# Patient Record
Sex: FEMALE | Race: WHITE | NOT HISPANIC OR LATINO | Employment: FULL TIME | URBAN - METROPOLITAN AREA
[De-identification: names, ages, dates, MRNs, and addresses within clinical notes are randomized per-mention and may not be internally consistent; named-entity substitution may affect disease eponyms.]

---

## 2023-02-27 ENCOUNTER — SPECIALTY PHARMACY (OUTPATIENT)
Dept: PHARMACY | Facility: CLINIC | Age: 23
End: 2023-02-27

## 2023-02-27 NOTE — TELEPHONE ENCOUNTER
Incoming call from [facility] at 008 to inform [facility] order for Tremfya was received and profiled.  Provider is an outside derm provider.  Prescription transferred from 008.  PA required.  Notifying GI/Derm team to work on PA.

## 2023-02-28 NOTE — TELEPHONE ENCOUNTER
Order set opened for Tremfya, outgoing call to eliseo GONZALEZ referral to [phone], nurse's phone line is [phone].    PA is needed, will follow up.      Lakesha, this is Yamile Johnson with [facility].  We are working on your prescription that your doctor has sent us. We will be working with your insurance to get this approved for you. We will be calling you along the way with updates on your medication.  If you have any questions, you can reach us at [phone].    Welcome call outcome: Patient/caregiver reached    Patient reports being on Tremfya therapy for 2 years, has one dose on hand for 3/15/23 injection . Normally dispensed through [employer], will follow up with referral forms to submit authorization. Patient expressed understanding.

## 2023-03-02 NOTE — TELEPHONE ENCOUNTER
Outgoing call to [facility] FARZANEH with [facility] requesting chart notes and referral form, faxed referral again to [phone].

## 2023-03-02 NOTE — TELEPHONE ENCOUNTER
Referral received from [facility] , scanned to .    DAKOTA KEY [id number],  approval on file until 12/30/23, CASE ID [id number].

## 2023-03-02 NOTE — TELEPHONE ENCOUNTER
Benefits investigation   Per [facility] website    Payor: [employer]  Annual Deductible Amount: 250  Annual Out of Packet (OOP) Maximum: 2500  Estimated Copay: 315  Network Status: in network     Outgoing call to Tremfya, loaded to myEDmatch test claim pays for 5.00. sending to initial.

## 2023-03-06 NOTE — TELEPHONE ENCOUNTER
Outgoing call to patient, patient would like initial closer to next fill date. Pending initial for 4/26/23.

## 2023-05-01 ENCOUNTER — SPECIALTY PHARMACY (OUTPATIENT)
Dept: PHARMACY | Facility: CLINIC | Age: 23
End: 2023-05-01

## 2023-05-01 DIAGNOSIS — L40.9 PSORIASIS: Primary | ICD-10-CM

## 2023-05-01 NOTE — TELEPHONE ENCOUNTER
Specialty Pharmacy - Initial Clinical Assessment    Specialty Medication Orders Linked to Encounter      Flowsheet Row Most Recent Value   Medication #1 guselkumab (TREMFYA) 100 mg/mL Syrg (Order#[id number], Rx#[id number])   Medication #2 guselkumab 100 mg/mL AtIn (Order#[id number], Rx#[id number])          Patient Diagnosis   L40.9 - Psoriasis    Subjective    Lisa Nagy is a 22 y.o. female, who is followed by the specialty pharmacy service for management and education.    Recent Encounters       Date Type Provider Description    05/01/2023 Specialty Pharmacy Yamile Johnson, Shaji Initial Clinical Assessment    02/27/2023 Specialty Pharmacy Africa Ocasio, Shaji Referral Authorization            Current Outpatient Medications   Medication Sig    guselkumab (TREMFYA) 100 mg/mL Syrg Inject 100mg into the skin every 8 weeks    guselkumab 100 mg/mL AtIn inject 100 mg under skin every 8 weeks   Last reviewed on 5/1/2023  1:13 PM by Yamile Johnson PharmD    Review of patient's allergies indicates:  Not on FileLast reviewed on    by           Assessment Questions - Documented Responses      Flowsheet Row Most Recent Value   Assessment    Medication Reconciliation completed for patient Yes   During the past 4 weeks, has patient missed any activities due to condition or medication? No   During the past 4 weeks, did patient have any of the following urgent care visits? None   Goals of Therapy Status Discussed (new start)   Status of the patients ability to self-administer: Is Able   All education points have been covered with patient? Yes, supplemental printed education provided   Welcome packet contents reviewed and discussed with patient? Yes   Assesment completed? Yes   Plan Therapy continued   Do you need to open a clinical intervention (i-vent)? No   Do you want to schedule first shipment? Yes   Medication #1 Assessment Info    Patient status Existing medication, New to [facility]   Is this medication appropriate for the  patient? Yes   Is this medication effective? Yes          Refill Questions - Documented Responses      Flowsheet Row Most Recent Value   Refill Screening Questions    When does the patient need to receive the medication? 05/14/23   Refill Delivery Questions    How will the patient receive the medication? MEDRx   When does the patient need to receive the medication? 05/14/23   Shipping Address Home   Address in [facility] confirmed and updated if neccessary? Yes   Expected Copay ($) 0   Is the patient able to afford the medication copay? Yes   Payment Method zero copay   Days supply of Refill 56   Supplies needed? No supplies needed   Refill activity completed? Yes   Refill activity plan Refill scheduled   Shipment/Pickup Date: 05/05/23            Objective    She has no past medical history on file.    Tried/failed medications: n/a    BP Readings from Last 4 Encounters:   No data found for BP     Ht Readings from Last 4 Encounters:   No data found for Ht     Wt Readings from Last 4 Encounters:   No data found for Wt     No results for input(s): CREATININE, ALT, AST, HEPBSAG, HEPBSAB, HEPBCAB in the last 2160 hours.  The goals of Psoriasis treatment include:  Reducing the size, thickness and extent of lesions and erythema  Relieving the symptoms of psoriasis  Improving and maintaining physical function  Preventing infection and other complications of treatment  Reducing long term complications of psoriasis  Minimizing psychological impact on overall well-being  Improving or maintaining quality of life  Maintaining optimal therapy adherence  Minimizing and managing side effects    Goals of Therapy Status: Discussed (new start)    Assessment/Plan  Patient plans to continue therapy without changes      Indication, dosage, appropriateness, effectiveness, safety and convenience of her specialty medication(s) were reviewed today.     Patient Education   Patient received education on the following:   Expectations and  possible outcomes of therapy  Proper use, timely administration, and missed dose management  Duration of therapy  Side effects, including prevention, minimization, and management  Contraindications and safety precautions  New or changed medications, including prescribe and over the counter medications and supplements  Reviews recommended vaccinations, as appropriate  Storage, safe handling, and disposal    Patient has been on Tremfya therapy for 2 years and reports she is doing well. Patient declined review of injection training.  Reports a flare due to stress with finals.    Tasks added this encounter   5/1/2023 - Initial Clinical Assessment/Patient Education (Annual Reassessment)  5/1/2023 - Set up Initial Fill   Tasks due within next 3 months   5/3/2023 - Initial Clinical Assessment/Patient Education (Annual Reassessment)     Yamile Johnson, PharmD  [facility]  [address]  [facility] [address]  Phone: [phone]  Fax: [phone]

## 2023-06-23 ENCOUNTER — SPECIALTY PHARMACY (OUTPATIENT)
Dept: PHARMACY | Facility: CLINIC | Age: 23
End: 2023-06-23

## 2023-06-23 DIAGNOSIS — L40.9 PSORIASIS: Primary | ICD-10-CM

## 2023-08-04 NOTE — TELEPHONE ENCOUNTER
Specialty Pharmacy - Refill Coordination    Specialty Medication Orders Linked to Encounter      Flowsheet Row Most Recent Value   Medication #1 guselkumab 100 mg/mL AtIn (Order#[id number], Rx#[id number])            Refill Questions - Documented Responses      Flowsheet Row Most Recent Value   Refill Screening Questions    Changes to allergies? No   Changes to medications? No   New conditions since last clinic visit? No   Unplanned office visit, urgent care, ED, or hospital admission in the last 4 weeks? No   How does patient/caregiver feel medication is working? Good   Financial problems or insurance changes? No   How many doses of your specialty medications were missed in the last 4 weeks? 0   Would patient like to speak to a pharmacist? No   When does the patient need to receive the medication? 08/18/23   Refill Delivery Questions    How will the patient receive the medication? MEDRx   When does the patient need to receive the medication? 08/18/23   Shipping Address Home   Address in [facility] confirmed and updated if neccessary? Yes   Expected Copay ($) 0   Is the patient able to afford the medication copay? Yes   Payment Method zero copay   Days supply of Refill 56   Supplies needed? No supplies needed   Refill activity completed? Yes   Refill activity plan Refill scheduled   Shipment/Pickup Date: 08/08/23            Current Outpatient Medications   Medication Sig    guselkumab 100 mg/mL AtIn inject 100 mg under skin every 8 weeks   Last reviewed on 5/1/2023  1:13 PM by Yamile Johnson, Shaji    Review of patient's allergies indicates:  Not on File Last reviewed on    by       Tasks added this encounter   No tasks added.   Tasks due within next 3 months   8/4/2023 - Refill Coordination Outreach (1 time occurrence)     Yamile Johnson, Shaji  [facility]  [address] [place] [address]  Phone: [phone]  Fax: [phone]

## 2024-04-11 PROBLEM — L40.9 PSORIASIS: Status: ACTIVE | Noted: 2024-04-11

## 2024-05-31 ENCOUNTER — PATIENT MESSAGE (OUTPATIENT)
Dept: ADMINISTRATIVE | Facility: OTHER | Age: 24
End: 2024-05-31

## 2025-01-08 ENCOUNTER — OFFICE VISIT (OUTPATIENT)
Dept: FAMILY MEDICINE | Facility: CLINIC | Age: 25
End: 2025-01-08
Payer: COMMERCIAL

## 2025-01-08 VITALS
OXYGEN SATURATION: 100 % | HEART RATE: 80 BPM | WEIGHT: 211.31 LBS | TEMPERATURE: 98 F | SYSTOLIC BLOOD PRESSURE: 123 MMHG | RESPIRATION RATE: 18 BRPM | HEIGHT: 64 IN | DIASTOLIC BLOOD PRESSURE: 79 MMHG | BODY MASS INDEX: 36.08 KG/M2

## 2025-01-08 DIAGNOSIS — D21.9 GRANULAR CELL TUMOR: ICD-10-CM

## 2025-01-08 DIAGNOSIS — E66.812 CLASS 2 OBESITY DUE TO EXCESS CALORIES WITHOUT SERIOUS COMORBIDITY WITH BODY MASS INDEX (BMI) OF 36.0 TO 36.9 IN ADULT: ICD-10-CM

## 2025-01-08 DIAGNOSIS — E66.09 CLASS 2 OBESITY DUE TO EXCESS CALORIES WITHOUT SERIOUS COMORBIDITY WITH BODY MASS INDEX (BMI) OF 36.0 TO 36.9 IN ADULT: ICD-10-CM

## 2025-01-08 DIAGNOSIS — L40.9 PSORIASIS: Primary | Chronic | ICD-10-CM

## 2025-01-08 DIAGNOSIS — Z11.4 SCREENING FOR HIV (HUMAN IMMUNODEFICIENCY VIRUS): ICD-10-CM

## 2025-01-08 DIAGNOSIS — N30.10 CHRONIC INTERSTITIAL CYSTITIS: Chronic | ICD-10-CM

## 2025-01-08 DIAGNOSIS — F41.9 ANXIETY: ICD-10-CM

## 2025-01-08 DIAGNOSIS — Z76.89 ENCOUNTER TO ESTABLISH CARE: ICD-10-CM

## 2025-01-08 PROBLEM — D84.821 IMMUNOSUPPRESSION DUE TO DRUG THERAPY: Chronic | Status: ACTIVE | Noted: 2021-01-19

## 2025-01-08 PROBLEM — N63.10 MASS OF RIGHT BREAST: Status: ACTIVE | Noted: 2018-05-29

## 2025-01-08 PROBLEM — Z28.21 REFUSAL OF HUMAN PAPILLOMA VIRUS (HPV) VACCINATION: Status: ACTIVE | Noted: 2020-07-09

## 2025-01-08 PROBLEM — Z79.899 IMMUNOSUPPRESSION DUE TO DRUG THERAPY: Chronic | Status: ACTIVE | Noted: 2021-01-19

## 2025-01-08 PROCEDURE — 1160F RVW MEDS BY RX/DR IN RCRD: CPT | Mod: CPTII,S$GLB,, | Performed by: STUDENT IN AN ORGANIZED HEALTH CARE EDUCATION/TRAINING PROGRAM

## 2025-01-08 PROCEDURE — 1159F MED LIST DOCD IN RCRD: CPT | Mod: CPTII,S$GLB,, | Performed by: STUDENT IN AN ORGANIZED HEALTH CARE EDUCATION/TRAINING PROGRAM

## 2025-01-08 PROCEDURE — 81025 URINE PREGNANCY TEST: CPT | Mod: PO | Performed by: STUDENT IN AN ORGANIZED HEALTH CARE EDUCATION/TRAINING PROGRAM

## 2025-01-08 PROCEDURE — 3044F HG A1C LEVEL LT 7.0%: CPT | Mod: CPTII,S$GLB,, | Performed by: STUDENT IN AN ORGANIZED HEALTH CARE EDUCATION/TRAINING PROGRAM

## 2025-01-08 PROCEDURE — 99999 PR PBB SHADOW E&M-EST. PATIENT-LVL V: CPT | Mod: PBBFAC,,, | Performed by: STUDENT IN AN ORGANIZED HEALTH CARE EDUCATION/TRAINING PROGRAM

## 2025-01-08 PROCEDURE — 3008F BODY MASS INDEX DOCD: CPT | Mod: CPTII,S$GLB,, | Performed by: STUDENT IN AN ORGANIZED HEALTH CARE EDUCATION/TRAINING PROGRAM

## 2025-01-08 PROCEDURE — 3078F DIAST BP <80 MM HG: CPT | Mod: CPTII,S$GLB,, | Performed by: STUDENT IN AN ORGANIZED HEALTH CARE EDUCATION/TRAINING PROGRAM

## 2025-01-08 PROCEDURE — G2211 COMPLEX E/M VISIT ADD ON: HCPCS | Mod: S$GLB,,, | Performed by: STUDENT IN AN ORGANIZED HEALTH CARE EDUCATION/TRAINING PROGRAM

## 2025-01-08 PROCEDURE — 3074F SYST BP LT 130 MM HG: CPT | Mod: CPTII,S$GLB,, | Performed by: STUDENT IN AN ORGANIZED HEALTH CARE EDUCATION/TRAINING PROGRAM

## 2025-01-08 PROCEDURE — 99204 OFFICE O/P NEW MOD 45 MIN: CPT | Mod: S$GLB,,, | Performed by: STUDENT IN AN ORGANIZED HEALTH CARE EDUCATION/TRAINING PROGRAM

## 2025-01-08 RX ORDER — SERTRALINE HYDROCHLORIDE 50 MG/1
50 TABLET, FILM COATED ORAL DAILY
Qty: 90 TABLET | Refills: 3 | Status: SHIPPED | OUTPATIENT
Start: 2025-01-08

## 2025-01-08 NOTE — PROGRESS NOTES
Problem List Items Addressed This Visit          Derm    Psoriasis - Primary (Chronic)    Overview     Chronic hx; stable on tremfya   Referral to derm         Relevant Orders    Ambulatory referral/consult to Dermatology       Renal/    Chronic interstitial cystitis (Chronic)    Overview     Chronic hx; stable   No concern  Previously followed with urology            Oncology    Granular cell tumor    Overview     R breast s/p resection             Endocrine    Class 2 obesity due to excess calories without serious comorbidity with body mass index (BMI) of 36.0 to 36.9 in adult    Overview     Wt Readings from Last 3 Encounters:   01/08/25 0801 95.8 kg (211 lb 4.8 oz)   09/13/24 0850 96.6 kg (213 lb)   09/08/23 0839 91.2 kg (201 lb)           General weight loss/lifestyle modification strategies discussed: limit sugary drinks, exercise 3-5x per week  Informal exercise measures discussed, e.g. taking stairs instead of elevator.                  Other Visit Diagnoses       Encounter to establish care        Relevant Orders    Lipid Panel    TSH    CBC Without Differential    Comprehensive Metabolic Panel    Hemoglobin A1C    Anxiety        Relevant Medications    sertraline (ZOLOFT) 50 MG tablet    Other Relevant Orders    Pregnancy, urine rapid (Completed)    Screening for HIV (human immunodeficiency virus)        Relevant Orders    HIV 1/2 Ag/Ab (4th Gen) (Completed)             Assessment & Plan    PSORIASIS:  - Evaluated the patient's history of psoriasis since age 7, no active lesions at this time   - Continued Tremfya for psoriasis management.  - Referred the patient to Dr. Ashly Moore for dermatology follow-up.  - Acknowledged the patient's history of psoriasis and its potential impact on other health decisions.    ANXIETY:  - Assessed the patient's anxiety, noting rare anxiety attacks usually related to school stress.  - Evaluated the patient's difficulty in managing stress and taking breaks.  - Discussed  the potential connection between anxiety and recent symptoms.  - Prescribed Zoloft for anxiety management.  - Noted the patient's current therapy twice a month for anxiety management.  - Scheduled a follow-up virtual visit in 6 weeks to assess Zoloft efficacy and overall progress.    CHRONIC HEADACHES:  - Evaluated the patient's history of chronic headaches, occurring about 3 times per week.  - Assessed the tension-type headaches starting from the neck and moving upwards.  - Discussed the relationship between stress and headaches.  - Explained the risk of rebound headaches with frequent use of acetaminophen  - Discussed the importance of identifying headache triggers, particularly stress.  - Noted previous neurologist prescriptions of topiramate and rizatriptan, discontinued due to side effects.    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  - Noted the tumor regrew or increased in size when the patient was 17 or 18 years old.  - Documented the surgical removal of the tumor, requiring two procedures under anesthesia.    FAMILY HISTORY OF HYPERTENSION:  - Documented the patient's family history of hypertension in both parents and grandmother.  - Discussed monitoring the patient's blood pressure.  - Suggested potential cardiac tests if symptoms recur, including stress test, echocardiogram, and heart ultrasound.    OTHER INSTRUCTIONS:  - Patient to continue with therapy sessions, currently attending 2 times per month.  - Patient to maintain regular exercise routine.  - Patient to identify and manage stress triggers, particularly related   to school and upcoming GRE studies.  - Educated on the importance of hydration and its potential role in preventing similar episodes.            Bonnie Hardin MD  _________________________________________________________________________      Patient ID:  Lisa Nagy is a 24 y.o. female.    History of Present Illness    CHIEF COMPLAINT:  Patient presents today for evaluation of an episode of lightheadedness and nausea.    HISTORY OF PRESENT ILLNESS:  She experienced an episode of lightheadedness on Friday night while getting ready for work. She reports feeling nauseous, lightheaded, and weak when sitting up from bed. Symptoms persisted, prompting her to call EMS while at work. Blood pressure was elevated at 160/90 at the time of EMS evaluation but returned to normal within the day, and she was able to resume normal activities.    HEADACHES:  She experiences tension-type headaches approximately 3 times per week, originating in the neck. She has previously consulted a neurologist for this issue.    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 She experiences anxiety with rare anxiety attacks typically occurring around school. She also reports joint pain and a history of kneecap dislocations.    FAMILY HISTORY:  Family history is significant for hypertension in both parents and maternal grandmother, all currently managed with medications.    MEDICATIONS:  She was previously on Zoloft for anxiety and is currently on Tremfya.    SOCIAL HISTORY:  She is currently on winter break from school, has recently started going to the gym, and attends therapy sessions twice a month.          Past medical histories reviewed, including past medical, surgical, family and social histories.      Current Outpatient Medications on File Prior to Visit   Medication Sig Dispense Refill    guselkumab (TREMFYA) 100 mg/mL AtIn Inject 100 mg into the skin every 8 weeks. 1 mL 3     No current facility-administered medications on file prior to visit.       Review of Systems   12 point review of systems negative except for listed in HPI.     Objective:    Nursing note and vitals reviewed.  Vitals:    01/08/25 0801   BP: 123/79    Pulse: 80   Resp: 18   Temp: 98.1 °F (36.7 °C)     Body mass index is 36.27 kg/m².     Physical Exam   Constitutional: oriented to person, place, and time. well-developed and well-nourished. No distress.   HENT: WNL  Head: Normocephalic and atraumatic.   Eyes: EOM are normal.   Neck: Normal range of motion. Neck supple.   Cardiovascular: Normal rate  Pulmonary/Chest: Effort normal. No respiratory distress.   GI: soft, non distended, no ttp, no rebound/guarding  Musculoskeletal: Normal range of motion. no edema.   Neurological: CN II-XII intact  Skin: warm and dry.   Psychiatric: normal mood and affect. behavior is normal.                 We Offer Telehealth & Same Day Appointments!   Book your Telehealth appointment with me through my nurse or   Clinic appointments on [employer]!  [phone]     To Schedule appointments online, go to [employer]: [email or web address]       Visit today included increased complexity associated with the care of the episodic problem addressed and managing the longitudinal care of the patient due to the serious and/or complex managed problem(s) as per problem list.     This note was generated with the assistance of ambient listening technology. Verbal consent was obtained by the patient and accompanying visitor(s) for the recording of patient appointment to facilitate this note. I attest to having reviewed and edited the generated note for accuracy, though some syntax or spelling errors may persist. Please contact the author of this note for any clarification.

## 2025-01-08 NOTE — PATIENT INSTRUCTIONS
Sage Gonzalez,     If you are due for any health screening(s) below please notify me so we can arrange them to be ordered and scheduled. Most healthy patients at your age complete them, but you are free to accept or refuse.     If you can't do it, I'll definitely understand. If you can, I'd certainly appreciate it!    All of your core healthy metrics are met.

## 2025-01-09 ENCOUNTER — LAB VISIT (OUTPATIENT)
Dept: LAB | Facility: HOSPITAL | Age: 25
End: 2025-01-09
Payer: COMMERCIAL

## 2025-01-09 DIAGNOSIS — Z11.4 SCREENING FOR HIV (HUMAN IMMUNODEFICIENCY VIRUS): ICD-10-CM

## 2025-01-09 DIAGNOSIS — Z76.89 ENCOUNTER TO ESTABLISH CARE: ICD-10-CM

## 2025-01-09 LAB
ALBUMIN SERPL BCP-MCNC: 3.8 G/DL (ref 3.5–5.2)
ALP SERPL-CCNC: 75 U/L (ref 40–150)
ALT SERPL W/O P-5'-P-CCNC: 31 U/L (ref 10–44)
ANION GAP SERPL CALC-SCNC: 7 MMOL/L (ref 8–16)
AST SERPL-CCNC: 19 U/L (ref 10–40)
B-HCG UR QL: NEGATIVE
BILIRUB SERPL-MCNC: 0.7 MG/DL (ref 0.1–1)
BUN SERPL-MCNC: 14 MG/DL (ref 6–20)
CALCIUM SERPL-MCNC: 9.5 MG/DL (ref 8.7–10.5)
CHLORIDE SERPL-SCNC: 109 MMOL/L (ref 95–110)
CHOLEST SERPL-MCNC: 175 MG/DL (ref 120–199)
CHOLEST/HDLC SERPL: 4.5 {RATIO} (ref 2–5)
CO2 SERPL-SCNC: 23 MMOL/L (ref 23–29)
CREAT SERPL-MCNC: 0.8 MG/DL (ref 0.5–1.4)
ERYTHROCYTE [DISTWIDTH] IN BLOOD BY AUTOMATED COUNT: 13.2 % (ref 11.5–14.5)
EST. GFR  (NO RACE VARIABLE): >60 ML/MIN/1.73 M^2
ESTIMATED AVG GLUCOSE: 97 MG/DL (ref 68–131)
GLUCOSE SERPL-MCNC: 87 MG/DL (ref 70–110)
HBA1C MFR BLD: 5 % (ref 4–5.6)
HCT VFR BLD AUTO: 42.1 % (ref 37–48.5)
HDLC SERPL-MCNC: 39 MG/DL (ref 40–75)
HDLC SERPL: 22.3 % (ref 20–50)
HGB BLD-MCNC: 13.9 G/DL (ref 12–16)
HIV 1+2 AB+HIV1 P24 AG SERPL QL IA: NORMAL
LDLC SERPL CALC-MCNC: 117.2 MG/DL (ref 63–159)
MCH RBC QN AUTO: 29.1 PG (ref 27–31)
MCHC RBC AUTO-ENTMCNC: 33 G/DL (ref 32–36)
MCV RBC AUTO: 88 FL (ref 82–98)
NONHDLC SERPL-MCNC: 136 MG/DL
PLATELET # BLD AUTO: 300 K/UL (ref 150–450)
PMV BLD AUTO: 10.8 FL (ref 9.2–12.9)
POTASSIUM SERPL-SCNC: 4 MMOL/L (ref 3.5–5.1)
PROT SERPL-MCNC: 7.5 G/DL (ref 6–8.4)
RBC # BLD AUTO: 4.78 M/UL (ref 4–5.4)
SODIUM SERPL-SCNC: 139 MMOL/L (ref 136–145)
TRIGL SERPL-MCNC: 94 MG/DL (ref 30–150)
TSH SERPL DL<=0.005 MIU/L-ACNC: 1.49 UIU/ML (ref 0.4–4)
WBC # BLD AUTO: 7.7 K/UL (ref 3.9–12.7)

## 2025-01-09 PROCEDURE — 87389 HIV-1 AG W/HIV-1&-2 AB AG IA: CPT | Performed by: STUDENT IN AN ORGANIZED HEALTH CARE EDUCATION/TRAINING PROGRAM

## 2025-01-09 PROCEDURE — 80061 LIPID PANEL: CPT | Performed by: STUDENT IN AN ORGANIZED HEALTH CARE EDUCATION/TRAINING PROGRAM

## 2025-01-09 PROCEDURE — 83036 HEMOGLOBIN GLYCOSYLATED A1C: CPT | Performed by: STUDENT IN AN ORGANIZED HEALTH CARE EDUCATION/TRAINING PROGRAM

## 2025-01-09 PROCEDURE — 85027 COMPLETE CBC AUTOMATED: CPT | Performed by: STUDENT IN AN ORGANIZED HEALTH CARE EDUCATION/TRAINING PROGRAM

## 2025-01-09 PROCEDURE — 84443 ASSAY THYROID STIM HORMONE: CPT | Performed by: STUDENT IN AN ORGANIZED HEALTH CARE EDUCATION/TRAINING PROGRAM

## 2025-01-09 PROCEDURE — 36415 COLL VENOUS BLD VENIPUNCTURE: CPT | Mod: PO | Performed by: STUDENT IN AN ORGANIZED HEALTH CARE EDUCATION/TRAINING PROGRAM

## 2025-01-09 PROCEDURE — 80053 COMPREHEN METABOLIC PANEL: CPT | Performed by: STUDENT IN AN ORGANIZED HEALTH CARE EDUCATION/TRAINING PROGRAM

## 2025-01-10 NOTE — PROGRESS NOTES
Labs normal     Please do not hesitate to call or message with any questions or concerns    Bonnie Hardin MD

## 2025-01-12 PROBLEM — E66.812 CLASS 2 OBESITY DUE TO EXCESS CALORIES WITHOUT SERIOUS COMORBIDITY WITH BODY MASS INDEX (BMI) OF 36.0 TO 36.9 IN ADULT: Status: ACTIVE | Noted: 2025-01-12

## 2025-01-12 PROBLEM — E66.09 CLASS 2 OBESITY DUE TO EXCESS CALORIES WITHOUT SERIOUS COMORBIDITY WITH BODY MASS INDEX (BMI) OF 36.0 TO 36.9 IN ADULT: Status: ACTIVE | Noted: 2025-01-12

## 2025-06-17 ENCOUNTER — LAB VISIT (OUTPATIENT)
Dept: LAB | Facility: HOSPITAL | Age: 25
End: 2025-06-17
Attending: NURSE PRACTITIONER
Payer: COMMERCIAL

## 2025-06-17 DIAGNOSIS — Z51.81 ENCOUNTER FOR THERAPEUTIC DRUG MONITORING: ICD-10-CM

## 2025-06-17 DIAGNOSIS — L40.0 PSORIASIS VULGARIS: ICD-10-CM

## 2025-06-17 LAB
HAV IGM SERPL QL IA: NORMAL
HBV CORE IGM SERPL QL IA: NORMAL
HBV SURFACE AG SERPL QL IA: NORMAL
HCV AB SERPL QL IA: NORMAL

## 2025-06-17 PROCEDURE — 36415 COLL VENOUS BLD VENIPUNCTURE: CPT | Mod: PO

## 2025-06-17 PROCEDURE — 86480 TB TEST CELL IMMUN MEASURE: CPT

## 2025-06-17 PROCEDURE — 80074 ACUTE HEPATITIS PANEL: CPT

## 2025-06-18 LAB
MITOGEN MINUS NIL (OHS): 9.99
NIL TB SYNCED (OHS): 0.01
QUANTIFERON GOLD INTERP (OHS): NEGATIVE
TB1 AG MINUS NIL (OHS): 0.01
TB2 AG MINUS NIL (OHS): <0